# Patient Record
Sex: FEMALE | Race: WHITE | NOT HISPANIC OR LATINO | Employment: FULL TIME | URBAN - NONMETROPOLITAN AREA
[De-identification: names, ages, dates, MRNs, and addresses within clinical notes are randomized per-mention and may not be internally consistent; named-entity substitution may affect disease eponyms.]

---

## 2017-04-11 ENCOUNTER — HOSPITAL ENCOUNTER (OUTPATIENT)
Dept: FAMILY MEDICINE CLINIC | Facility: CLINIC | Age: 42
Setting detail: SPECIMEN
Discharge: HOME | End: 2017-04-11
Attending: NURSE PRACTITIONER | Admitting: NURSE PRACTITIONER

## 2017-04-11 LAB
ANION GAP SERPL CALC-SCNC: 14.5 MMOL/L (ref 10–20)
BASOPHILS # BLD AUTO: 0 10*3/UL (ref 0–0.2)
BASOPHILS NFR BLD AUTO: 0 % (ref 0–2)
BUN SERPL-MCNC: 8 MG/DL (ref 8–20)
BUN/CREAT SERPL: 11.4 (ref 5.4–26.2)
CALCIUM SERPL-MCNC: 9.4 MG/DL (ref 8.9–10.3)
CHLORIDE SERPL-SCNC: 104 MMOL/L (ref 101–111)
CHOLEST SERPL-MCNC: 148 MG/DL
CHOLEST/HDLC SERPL: 2.4 {RATIO}
CONV CO2: 27 MMOL/L (ref 22–32)
CONV LDL CHOLESTEROL DIRECT: 80 MG/DL (ref 0–100)
CREAT UR-MCNC: 0.7 MG/DL (ref 0.4–1)
DIFFERENTIAL METHOD BLD: (no result)
EOSINOPHIL # BLD AUTO: 0.1 10*3/UL (ref 0–0.3)
EOSINOPHIL # BLD AUTO: 2 % (ref 0–3)
ERYTHROCYTE [DISTWIDTH] IN BLOOD BY AUTOMATED COUNT: 12.9 % (ref 11.5–14.5)
GLUCOSE SERPL-MCNC: 75 MG/DL (ref 65–99)
HCT VFR BLD AUTO: 36.1 % (ref 35–49)
HDLC SERPL-MCNC: 61 MG/DL
HGB BLD-MCNC: 12.6 G/DL (ref 12–15)
LDLC/HDLC SERPL: 1.3 {RATIO}
LIPID INTERPRETATION: NORMAL
LYMPHOCYTES # BLD AUTO: 2.3 10*3/UL (ref 0.8–4.8)
LYMPHOCYTES NFR BLD AUTO: 41 % (ref 18–42)
MCH RBC QN AUTO: 33.3 PG (ref 26–32)
MCHC RBC AUTO-ENTMCNC: 34.8 G/DL (ref 32–36)
MCV RBC AUTO: 95.7 FL (ref 80–94)
MONOCYTES # BLD AUTO: 0.3 10*3/UL (ref 0.1–1.3)
MONOCYTES NFR BLD AUTO: 6 % (ref 2–11)
NEUTROPHILS # BLD AUTO: 2.8 10*3/UL (ref 2.3–8.6)
NEUTROPHILS NFR BLD AUTO: 51 % (ref 50–75)
NRBC BLD AUTO-RTO: 0 /100{WBCS}
NRBC/RBC NFR BLD MANUAL: 0 10*3/UL
PLATELET # BLD AUTO: 193 10*3/UL (ref 150–450)
PMV BLD AUTO: 9.2 FL (ref 7.4–10.4)
POTASSIUM SERPL-SCNC: 3.5 MMOL/L (ref 3.6–5.1)
RBC # BLD AUTO: 3.77 10*6/UL (ref 4–5.4)
SODIUM SERPL-SCNC: 142 MMOL/L (ref 136–144)
TRIGL SERPL-MCNC: 61 MG/DL
TSH SERPL-ACNC: 1.39 UIU/ML (ref 0.34–5.6)
VLDLC SERPL CALC-MCNC: 7.8 MG/DL
WBC # BLD AUTO: 5.6 10*3/UL (ref 4.5–11.5)

## 2017-04-17 ENCOUNTER — HOSPITAL ENCOUNTER (OUTPATIENT)
Dept: MAMMOGRAPHY | Facility: HOSPITAL | Age: 42
Discharge: HOME | End: 2017-04-17
Attending: NURSE PRACTITIONER | Admitting: NURSE PRACTITIONER

## 2017-04-24 ENCOUNTER — HOSPITAL ENCOUNTER (OUTPATIENT)
Dept: MAMMOGRAPHY | Facility: HOSPITAL | Age: 42
Discharge: HOME | End: 2017-04-24
Attending: NURSE PRACTITIONER | Admitting: NURSE PRACTITIONER

## 2017-04-27 ENCOUNTER — HOSPITAL ENCOUNTER (OUTPATIENT)
Dept: FAMILY MEDICINE CLINIC | Facility: CLINIC | Age: 42
Setting detail: SPECIMEN
Discharge: HOME | End: 2017-04-27
Attending: NURSE PRACTITIONER | Admitting: NURSE PRACTITIONER

## 2017-04-27 LAB
GLUCOSE SERPL-MCNC: 79 MG/DL (ref 65–99)
POTASSIUM SERPL-SCNC: 3.9 MMOL/L (ref 3.6–5.1)

## 2017-05-30 ENCOUNTER — HOSPITAL ENCOUNTER (OUTPATIENT)
Dept: PHYSICAL THERAPY | Facility: HOSPITAL | Age: 42
Setting detail: RECURRING SERIES
Discharge: HOME | End: 2017-08-04
Attending: NURSE PRACTITIONER | Admitting: NURSE PRACTITIONER

## 2020-04-24 ENCOUNTER — HOSPITAL ENCOUNTER (EMERGENCY)
Facility: HOSPITAL | Age: 45
Discharge: HOME | End: 2020-04-24
Attending: EMERGENCY MEDICINE | Admitting: EMERGENCY MEDICINE

## 2020-04-24 VITALS
OXYGEN SATURATION: 100 % | RESPIRATION RATE: 16 BRPM | BODY MASS INDEX: 24.92 KG/M2 | WEIGHT: 145.94 LBS | HEIGHT: 64 IN | SYSTOLIC BLOOD PRESSURE: 121 MMHG | TEMPERATURE: 97.9 F | HEART RATE: 88 BPM | DIASTOLIC BLOOD PRESSURE: 74 MMHG

## 2020-04-24 DIAGNOSIS — R10.2 PELVIC PAIN: Primary | ICD-10-CM

## 2020-04-24 LAB
ANION GAP SERPL CALCULATED.3IONS-SCNC: 11 MMOL/L (ref 5–15)
B-HCG UR QL: NEGATIVE
BACTERIA UR QL AUTO: ABNORMAL /HPF
BASOPHILS # BLD AUTO: 0 10*3/MM3 (ref 0–0.2)
BASOPHILS NFR BLD AUTO: 0.5 % (ref 0–1.5)
BILIRUB UR QL STRIP: NEGATIVE
BUN BLD-MCNC: 12 MG/DL (ref 6–20)
BUN/CREAT SERPL: 16.9 (ref 7–25)
CALCIUM SPEC-SCNC: 9.2 MG/DL (ref 8.6–10.5)
CHLORIDE SERPL-SCNC: 104 MMOL/L (ref 98–107)
CLARITY UR: CLEAR
CO2 SERPL-SCNC: 28 MMOL/L (ref 22–29)
COLOR UR: YELLOW
CREAT BLD-MCNC: 0.71 MG/DL (ref 0.57–1)
DEPRECATED RDW RBC AUTO: 44.2 FL (ref 37–54)
EOSINOPHIL # BLD AUTO: 0.1 10*3/MM3 (ref 0–0.4)
EOSINOPHIL NFR BLD AUTO: 1.6 % (ref 0.3–6.2)
ERYTHROCYTE [DISTWIDTH] IN BLOOD BY AUTOMATED COUNT: 13.1 % (ref 12.3–15.4)
GFR SERPL CREATININE-BSD FRML MDRD: 90 ML/MIN/1.73
GLUCOSE BLD-MCNC: 97 MG/DL (ref 65–99)
GLUCOSE UR STRIP-MCNC: NEGATIVE MG/DL
HCT VFR BLD AUTO: 36.8 % (ref 34–46.6)
HGB BLD-MCNC: 12.7 G/DL (ref 12–15.9)
HGB UR QL STRIP.AUTO: ABNORMAL
HYALINE CASTS UR QL AUTO: ABNORMAL /LPF
KETONES UR QL STRIP: ABNORMAL
LEUKOCYTE ESTERASE UR QL STRIP.AUTO: NEGATIVE
LYMPHOCYTES # BLD AUTO: 1.6 10*3/MM3 (ref 0.7–3.1)
LYMPHOCYTES NFR BLD AUTO: 21.5 % (ref 19.6–45.3)
MCH RBC QN AUTO: 33.3 PG (ref 26.6–33)
MCHC RBC AUTO-ENTMCNC: 34.6 G/DL (ref 31.5–35.7)
MCV RBC AUTO: 96.2 FL (ref 79–97)
MONOCYTES # BLD AUTO: 0.5 10*3/MM3 (ref 0.1–0.9)
MONOCYTES NFR BLD AUTO: 6.6 % (ref 5–12)
NEUTROPHILS # BLD AUTO: 5.2 10*3/MM3 (ref 1.7–7)
NEUTROPHILS NFR BLD AUTO: 69.8 % (ref 42.7–76)
NITRITE UR QL STRIP: NEGATIVE
NRBC BLD AUTO-RTO: 0.1 /100 WBC (ref 0–0.2)
PH UR STRIP.AUTO: 5.5 [PH] (ref 5–8)
PLATELET # BLD AUTO: 206 10*3/MM3 (ref 140–450)
PMV BLD AUTO: 7.9 FL (ref 6–12)
POTASSIUM BLD-SCNC: 3.8 MMOL/L (ref 3.5–5.2)
PROT UR QL STRIP: NEGATIVE
RBC # BLD AUTO: 3.83 10*6/MM3 (ref 3.77–5.28)
RBC # UR: ABNORMAL /HPF
REF LAB TEST METHOD: ABNORMAL
SODIUM BLD-SCNC: 143 MMOL/L (ref 136–145)
SP GR UR STRIP: 1.02 (ref 1–1.03)
SQUAMOUS #/AREA URNS HPF: ABNORMAL /HPF
UROBILINOGEN UR QL STRIP: ABNORMAL
WBC NRBC COR # BLD: 7.4 10*3/MM3 (ref 3.4–10.8)
WBC UR QL AUTO: ABNORMAL /HPF

## 2020-04-24 PROCEDURE — 99283 EMERGENCY DEPT VISIT LOW MDM: CPT

## 2020-04-24 PROCEDURE — 85025 COMPLETE CBC W/AUTO DIFF WBC: CPT | Performed by: EMERGENCY MEDICINE

## 2020-04-24 PROCEDURE — 80048 BASIC METABOLIC PNL TOTAL CA: CPT | Performed by: EMERGENCY MEDICINE

## 2020-04-24 PROCEDURE — 81025 URINE PREGNANCY TEST: CPT | Performed by: EMERGENCY MEDICINE

## 2020-04-24 PROCEDURE — 81001 URINALYSIS AUTO W/SCOPE: CPT | Performed by: EMERGENCY MEDICINE

## 2020-04-24 NOTE — ED PROVIDER NOTES
Subjective   45-year-old female complains of moderate pelvic pain at 11:30 PM at rest.  Pain has essentially resolved at this time.  Patient felt nauseous with it earlier.  Patient had a normal menstrual period 1 week ago and states she has had pelvic pain with menstrual cramps before.  Patient denies any vaginal bleeding or discharge otherwise.  No fever.          Review of Systems   Gastrointestinal: Positive for abdominal pain and nausea.   All other systems reviewed and are negative.      History reviewed. No pertinent past medical history.    No Known Allergies    Past Surgical History:   Procedure Laterality Date   • TUBAL ABDOMINAL LIGATION         History reviewed. No pertinent family history.    Social History     Socioeconomic History   • Marital status:      Spouse name: Not on file   • Number of children: Not on file   • Years of education: Not on file   • Highest education level: Not on file   Tobacco Use   • Smoking status: Former Smoker   Substance and Sexual Activity   • Alcohol use: Yes   • Drug use: Yes     Types: Marijuana   • Sexual activity: Yes           Objective   Physical Exam   Constitutional: She is oriented to person, place, and time. She appears well-developed and well-nourished.   HENT:   Head: Normocephalic and atraumatic.   Mouth/Throat: Oropharynx is clear and moist.   Eyes: Pupils are equal, round, and reactive to light. Conjunctivae are normal.   Neck: Normal range of motion. Neck supple.   Cardiovascular: Normal rate, regular rhythm and normal heart sounds.   Pulmonary/Chest: Effort normal and breath sounds normal.   Abdominal: Soft. Bowel sounds are normal. She exhibits no distension. There is no tenderness.   Musculoskeletal: Normal range of motion. She exhibits no edema or tenderness.   Neurological: She is alert and oriented to person, place, and time.   Skin: Skin is warm and dry. Capillary refill takes less than 2 seconds.   Psychiatric: She has a normal mood and  affect. Her behavior is normal.       Procedures           ED Course                                           MDM  Number of Diagnoses or Management Options  Pelvic pain:   Diagnosis management comments: Results for orders placed or performed during the hospital encounter of 04/24/20  -Basic Metabolic Panel       Result                      Value             Ref Range           Glucose                     97                65 - 99 mg/dL       BUN                         12                6 - 20 mg/dL        Creatinine                  0.71              0.57 - 1.00 *       Sodium                      143               136 - 145 mm*       Potassium                   3.8               3.5 - 5.2 mm*       Chloride                    104               98 - 107 mmo*       CO2                         28.0              22.0 - 29.0 *       Calcium                     9.2               8.6 - 10.5 m*       eGFR Non African Amer       90                >60 mL/min/1*       BUN/Creatinine Ratio        16.9              7.0 - 25.0          Anion Gap                   11.0              5.0 - 15.0 m*  -Urinalysis With Microscopic If Indicated (No Culture) - Urine, Clean Catch       Result                      Value             Ref Range           Color, UA                   Yellow            Yellow, Straw       Appearance, UA              Clear             Clear               pH, UA                      5.5               5.0 - 8.0           Specific Gravity, UA        1.023             1.005 - 1.030       Glucose, UA                 Negative          Negative            Ketones, UA                 Trace (A)         Negative            Bilirubin, UA               Negative          Negative            Blood, UA                                     Negative        Small (1+) (A)       Protein, UA                 Negative          Negative            Leuk Esterase, UA           Negative          Negative            Nitrite, UA                  Negative          Negative            Urobilinogen, UA            0.2 E.U./dL       0.2 - 1.0 E.*  -Pregnancy, Urine - Urine, Clean Catch       Result                      Value             Ref Range           HCG, Urine QL               Negative          Negative       -CBC Auto Differential       Result                      Value             Ref Range           WBC                         7.40              3.40 - 10.80*       RBC                         3.83              3.77 - 5.28 *       Hemoglobin                  12.7              12.0 - 15.9 *       Hematocrit                  36.8              34.0 - 46.6 %       MCV                         96.2              79.0 - 97.0 *       MCH                         33.3 (H)          26.6 - 33.0 *       MCHC                        34.6              31.5 - 35.7 *       RDW                         13.1              12.3 - 15.4 %       RDW-SD                      44.2              37.0 - 54.0 *       MPV                         7.9               6.0 - 12.0 fL       Platelets                   206               140 - 450 10*       Neutrophil %                69.8              42.7 - 76.0 %       Lymphocyte %                21.5              19.6 - 45.3 %       Monocyte %                  6.6               5.0 - 12.0 %        Eosinophil %                1.6               0.3 - 6.2 %         Basophil %                  0.5               0.0 - 1.5 %         Neutrophils, Absolute       5.20              1.70 - 7.00 *       Lymphocytes, Absolute       1.60              0.70 - 3.10 *       Monocytes, Absolute         0.50              0.10 - 0.90 *       Eosinophils, Absolute       0.10              0.00 - 0.40 *       Basophils, Absolute         0.00              0.00 - 0.20 *       nRBC                        0.1               0.0 - 0.2 /1*  -Urinalysis, Microscopic Only - Urine, Clean Catch       Result                      Value             Ref Range           RBC, UA                      3-5 (A)           None Seen /H*       WBC, UA                     3-5 (A)           None Seen /H*       Bacteria, UA                Trace (A)         None Seen /H*       Squamous Epithelial Ce*     3-6 (A)           None Seen, 0*       Hyaline Casts, UA           3-6               None Seen /L*       Methodology                                                   Automated Microscopy  Pain free in ED, appy precautions reviewed       Amount and/or Complexity of Data Reviewed  Clinical lab tests: reviewed        Final diagnoses:   Pelvic pain            João Rushing MD  04/24/20 0316

## 2020-04-24 NOTE — ED NOTES
Was getting ready to eat dinner and had sharp pain/pressure in pelvic area and rectum. Tender to touch in lower abdomen. Denies any urinary symptoms. LMP was last week.Reports 1 episode of vomiting and sweating     Esqueda, Lizeth A, RN  04/24/20 0147       Lizeth Esqueda RN  04/24/20 0152

## 2023-10-30 ENCOUNTER — TELEPHONE (OUTPATIENT)
Dept: FAMILY MEDICINE CLINIC | Facility: CLINIC | Age: 48
End: 2023-10-30
Payer: COMMERCIAL

## 2023-10-30 NOTE — TELEPHONE ENCOUNTER
HUB to read description below.    LVM FOR PT TO CALL OFFICE AND RESCHEDULE APPOINTMENT THAT'S ON 11-1-23 PLEASE SCHEDULE NEXT AVAILABLE THANK YOU

## 2024-07-31 ENCOUNTER — APPOINTMENT (OUTPATIENT)
Dept: CT IMAGING | Facility: HOSPITAL | Age: 49
End: 2024-07-31
Payer: COMMERCIAL

## 2024-07-31 ENCOUNTER — HOSPITAL ENCOUNTER (EMERGENCY)
Facility: HOSPITAL | Age: 49
Discharge: HOME | End: 2024-07-31
Attending: EMERGENCY MEDICINE
Payer: COMMERCIAL

## 2024-07-31 VITALS
OXYGEN SATURATION: 98 % | HEART RATE: 74 BPM | WEIGHT: 145 LBS | TEMPERATURE: 98 F | DIASTOLIC BLOOD PRESSURE: 67 MMHG | SYSTOLIC BLOOD PRESSURE: 120 MMHG | HEIGHT: 64 IN | BODY MASS INDEX: 24.75 KG/M2 | RESPIRATION RATE: 18 BRPM

## 2024-07-31 DIAGNOSIS — K43.9 VENTRAL HERNIA WITHOUT OBSTRUCTION OR GANGRENE: Primary | ICD-10-CM

## 2024-07-31 DIAGNOSIS — D25.9 UTERINE LEIOMYOMA, UNSPECIFIED LOCATION: ICD-10-CM

## 2024-07-31 PROCEDURE — 99285 EMERGENCY DEPT VISIT HI MDM: CPT

## 2024-07-31 PROCEDURE — 74177 CT ABD & PELVIS W/CONTRAST: CPT

## 2024-07-31 PROCEDURE — 99283 EMERGENCY DEPT VISIT LOW MDM: CPT | Performed by: EMERGENCY MEDICINE

## 2024-07-31 PROCEDURE — 25510000001 IOPAMIDOL PER 1 ML: Performed by: EMERGENCY MEDICINE

## 2024-07-31 RX ORDER — SODIUM CHLORIDE 0.9 % (FLUSH) 0.9 %
10 SYRINGE (ML) INJECTION AS NEEDED
Status: DISCONTINUED | OUTPATIENT
Start: 2024-07-31 | End: 2024-07-31 | Stop reason: HOSPADM

## 2024-07-31 RX ADMIN — IOPAMIDOL 100 ML: 755 INJECTION, SOLUTION INTRAVENOUS at 17:01

## 2024-07-31 NOTE — DISCHARGE INSTRUCTIONS
Please arrange for consultation with general surgery.  Arrange for outpatient pelvic ultrasound and follow-up with gynecology.  Return to ER for any concerns.  No heavy lifting until seen by surgery.  Follow-up with PCP otherwise.

## 2024-07-31 NOTE — FSED PROVIDER NOTE
Subjective   History of Present Illness  Patient has swelling to her abdominal wall that is causing her some concern.  She states that she first noticed it 2 weeks ago.  It is a little bit tender to touch, but is overall not painful when she is at rest.  She denies any nausea or vomiting.  No diarrhea.  No constipation.  No hematuria or dysuria.        Review of Systems   Constitutional: Negative.  Negative for chills, fatigue and fever.   Eyes: Negative.    Respiratory:  Negative for cough, chest tightness and shortness of breath.    Cardiovascular:  Negative for chest pain and palpitations.   Gastrointestinal:  Positive for abdominal distention and abdominal pain. Negative for diarrhea, nausea and vomiting.   Genitourinary: Negative.    Musculoskeletal: Negative.    Skin: Negative.  Negative for rash.   Neurological: Negative.  Negative for syncope, weakness, numbness and headaches.   Psychiatric/Behavioral: Negative.     All other systems reviewed and are negative.      No past medical history on file.    No Known Allergies    Past Surgical History:   Procedure Laterality Date    TUBAL ABDOMINAL LIGATION         No family history on file.    Social History     Socioeconomic History    Marital status:    Tobacco Use    Smoking status: Former   Substance and Sexual Activity    Alcohol use: Yes    Drug use: Yes     Types: Marijuana    Sexual activity: Yes           Objective   Physical Exam  Vitals and nursing note reviewed.   Constitutional:       General: She is not in acute distress.     Appearance: Normal appearance. She is normal weight.   HENT:      Right Ear: External ear normal.      Left Ear: External ear normal.      Nose: Nose normal.   Cardiovascular:      Rate and Rhythm: Normal rate.   Pulmonary:      Effort: Pulmonary effort is normal.   Abdominal:      Hernia: Hernia is present in the ventral area.       Musculoskeletal:         General: Normal range of motion.      Cervical back: Normal  range of motion.   Skin:     Capillary Refill: Capillary refill takes less than 2 seconds.   Neurological:      General: No focal deficit present.      Mental Status: She is alert and oriented to person, place, and time.   Psychiatric:         Mood and Affect: Mood normal.         Procedures           ED Course                                           Medical Decision Making  Patient has anterior abdominal wall swelling that I suspect is a ventral hernia.  CT scan is ordered to confirm the diagnosis.  Could be a lipoma.  Could also be a hematoma.    It is infected ventral hernia.  Patient also has uterine fibroids and she is suggested for outpatient follow-up.    Problems Addressed:  Uterine leiomyoma, unspecified location: complicated acute illness or injury  Ventral hernia without obstruction or gangrene: complicated acute illness or injury    Amount and/or Complexity of Data Reviewed  Radiology: ordered. Decision-making details documented in ED Course.    Risk  Prescription drug management.        Final diagnoses:   Ventral hernia without obstruction or gangrene   Uterine leiomyoma, unspecified location       ED Disposition  ED Disposition       ED Disposition   Discharge    Condition   Stable    Comment   --               Nilton Larose MD  [address] [place] [address]  [phone]    Call today  For surgical consultation         Medication List      No changes were made to your prescriptions during this visit.

## 2024-09-09 ENCOUNTER — OFFICE VISIT (OUTPATIENT)
Dept: SURGERY | Facility: CLINIC | Age: 49
End: 2024-09-09
Payer: COMMERCIAL

## 2024-09-09 VITALS
OXYGEN SATURATION: 100 % | SYSTOLIC BLOOD PRESSURE: 118 MMHG | DIASTOLIC BLOOD PRESSURE: 71 MMHG | WEIGHT: 134.2 LBS | HEIGHT: 64 IN | TEMPERATURE: 99.3 F | BODY MASS INDEX: 22.91 KG/M2 | HEART RATE: 91 BPM

## 2024-09-09 DIAGNOSIS — K43.9 VENTRAL HERNIA WITHOUT OBSTRUCTION OR GANGRENE: Primary | ICD-10-CM

## 2024-09-09 PROCEDURE — 99203 OFFICE O/P NEW LOW 30 MIN: CPT | Performed by: STUDENT IN AN ORGANIZED HEALTH CARE EDUCATION/TRAINING PROGRAM

## 2024-09-09 RX ORDER — CLOBETASOL PROPIONATE 0.5 MG/G
1 CREAM TOPICAL EVERY 12 HOURS SCHEDULED
COMMUNITY

## 2024-09-10 PROBLEM — K43.9 VENTRAL HERNIA WITHOUT OBSTRUCTION OR GANGRENE: Status: ACTIVE | Noted: 2024-09-10

## 2024-09-10 NOTE — PROGRESS NOTES
"Chief Complaint  Hernia (Ventral Hernia) and New Patient (Ref. By Dr. Nelson)    Subjective        Yuli Jameson presents to [facility]  History of Present Illness    49-year-old lady with history of tubal ligation, no other significant past medical or surgical history who comes to see me for ventral hernia.  She has noticed a lump in her upper abdomen for the past few months, few weeks ago this became painful she went to urgent care got a CT scan which showed a small 1 cm ventral hernia containing fat without evidence of inflammation.  Her pain resolved without intervention.  She denies any further pain.  Discussed options to proceed with repair versus continued medical management and monitoring as this is not causing her symptoms at this time she would like to hold off on surgery.  We discussed return precautions if she develops severe pain especially severe pain with nausea or vomiting she is to go directly to the emergency department.  Otherwise if she changes her mind like to discuss surgery further she can see me in the office.    Objective   Vital Signs:  /71 (Cuff Size: Adult)   Pulse 91   Temp 99.3 °F (37.4 °C) (Infrared)   Ht 162.6 cm (64\")   Wt 60.9 kg (134 lb 3.2 oz)   SpO2 100%   BMI 23.04 kg/m²   Estimated body mass index is 23.04 kg/m² as calculated from the following:    Height as of this encounter: 162.6 cm (64\").    Weight as of this encounter: 60.9 kg (134 lb 3.2 oz).    BMI is within normal parameters. No other follow-up for BMI required.      Physical Exam  Constitutional:       General: She is not in acute distress.     Appearance: Normal appearance. She is not ill-appearing.   HENT:      Head: Normocephalic and atraumatic.      Right Ear: External ear normal.      Left Ear: External ear normal.   Eyes:      Extraocular Movements: Extraocular movements intact.      Conjunctiva/sclera: Conjunctivae normal.   Cardiovascular:      Rate and Rhythm: " Normal rate and regular rhythm.   Pulmonary:      Effort: Pulmonary effort is normal. No respiratory distress.   Abdominal:      General: There is no distension.      Palpations: Abdomen is soft.      Tenderness: There is no abdominal tenderness.   Musculoskeletal:         General: No swelling or deformity.   Skin:     General: Skin is warm and dry.   Neurological:      Mental Status: She is alert and oriented to person, place, and time. Mental status is at baseline.        Result Review :                Assessment and Plan   Diagnoses and all orders for this visit:    1. Ventral hernia without obstruction or gangrene (Primary)      49-year-old lady with history of tubal ligation, no other significant past medical or surgical history who comes to see me for ventral hernia.  She has noticed a lump in her upper abdomen for the past few months, few weeks ago this became painful she went to urgent care got a CT scan which showed a small 1 cm ventral hernia containing fat without evidence of inflammation.  Her pain resolved without intervention.  She denies any further pain.  Discussed options to proceed with repair versus continued medical management and monitoring as this is not causing her symptoms at this time she would like to hold off on surgery.  We discussed return precautions if she develops severe pain especially severe pain with nausea or vomiting she is to go directly to the emergency department.  Otherwise if she changes her mind like to discuss surgery further she can see me in the office.         Follow Up   No follow-ups on file.  Patient was given instructions and counseling regarding her condition or for health maintenance advice. Please see specific information pulled into the AVS if appropriate.

## 2024-09-19 ENCOUNTER — PATIENT ROUNDING (BHMG ONLY) (OUTPATIENT)
Dept: SURGERY | Facility: CLINIC | Age: 49
End: 2024-09-19
Payer: COMMERCIAL